# Patient Record
Sex: MALE | Race: WHITE | NOT HISPANIC OR LATINO | Employment: OTHER | URBAN - NONMETROPOLITAN AREA
[De-identification: names, ages, dates, MRNs, and addresses within clinical notes are randomized per-mention and may not be internally consistent; named-entity substitution may affect disease eponyms.]

---

## 2024-10-23 ENCOUNTER — TELEPHONE (OUTPATIENT)
Dept: CARDIOLOGY | Facility: CLINIC | Age: 44
End: 2024-10-23
Payer: MEDICAID

## 2024-10-23 NOTE — TELEPHONE ENCOUNTER
Relay    Calling to inform you that your appointment with Dr. Galindo on 11/14 @ 11AM has been rescheduled to 11/14 @ 1:30PM. Thank you

## 2024-11-14 ENCOUNTER — OFFICE VISIT (OUTPATIENT)
Dept: CARDIOLOGY | Facility: CLINIC | Age: 44
End: 2024-11-14
Payer: MEDICAID

## 2024-11-14 VITALS
BODY MASS INDEX: 33.75 KG/M2 | HEIGHT: 72 IN | WEIGHT: 249.2 LBS | SYSTOLIC BLOOD PRESSURE: 131 MMHG | DIASTOLIC BLOOD PRESSURE: 86 MMHG | OXYGEN SATURATION: 97 % | HEART RATE: 84 BPM

## 2024-11-14 DIAGNOSIS — Z87.891 FORMER SMOKER: ICD-10-CM

## 2024-11-14 DIAGNOSIS — I10 ESSENTIAL HYPERTENSION: Primary | ICD-10-CM

## 2024-11-14 DIAGNOSIS — F19.91 HISTORY OF DRUG USE: ICD-10-CM

## 2024-11-14 DIAGNOSIS — R55 SYNCOPE AND COLLAPSE: ICD-10-CM

## 2024-11-14 DIAGNOSIS — E78.5 HYPERLIPIDEMIA LDL GOAL <100: ICD-10-CM

## 2024-11-14 DIAGNOSIS — R00.2 PALPITATIONS: ICD-10-CM

## 2024-11-14 PROBLEM — K76.0 FATTY LIVER: Status: ACTIVE | Noted: 2024-11-14

## 2024-11-14 PROCEDURE — 3075F SYST BP GE 130 - 139MM HG: CPT | Performed by: INTERNAL MEDICINE

## 2024-11-14 PROCEDURE — 99204 OFFICE O/P NEW MOD 45 MIN: CPT | Performed by: INTERNAL MEDICINE

## 2024-11-14 PROCEDURE — 93000 ELECTROCARDIOGRAM COMPLETE: CPT | Performed by: INTERNAL MEDICINE

## 2024-11-14 PROCEDURE — 3079F DIAST BP 80-89 MM HG: CPT | Performed by: INTERNAL MEDICINE

## 2024-11-14 RX ORDER — FENOFIBRATE 145 MG/1
145 TABLET, COATED ORAL DAILY
COMMUNITY

## 2024-11-14 RX ORDER — ATORVASTATIN CALCIUM 40 MG/1
40 TABLET, FILM COATED ORAL DAILY
Qty: 30 TABLET | Refills: 11 | Status: SHIPPED | OUTPATIENT
Start: 2024-11-14

## 2024-11-14 RX ORDER — TADALAFIL 2.5 MG/1
1 TABLET ORAL DAILY
COMMUNITY
Start: 2024-10-10

## 2024-11-14 RX ORDER — LEVOCETIRIZINE DIHYDROCHLORIDE 5 MG/1
5 TABLET, FILM COATED ORAL EVERY EVENING
COMMUNITY
Start: 2024-10-10

## 2024-11-14 RX ORDER — ALBUTEROL SULFATE 90 UG/1
2 AEROSOL, METERED RESPIRATORY (INHALATION) EVERY 4 HOURS PRN
COMMUNITY
Start: 2024-09-10

## 2024-11-14 NOTE — PROGRESS NOTES
Subjective   Kalia Burk is a 44 y.o. male     Chief Complaint   Patient presents with    Establish Care     Here for eval. Per pcp for palps    Palpitations       PROBLEM LIST:     Palpitations  HTN  Hyperlipidemia  Syncope in past approx. 4-5 yrs. Ago  Former smoker  Fatty Liver  Prior Drug use    Specialty Problems    None        HPI:    Mr. Kalia Burk is a 44-year-old patient of Sunita Barrera seen today for evaluation of palpitations.    Mr. Burk describes having intermittent palpitations since early adulthood.  He will feel a little fluttering in his chest as well as symptoms more typical of sensed extrasystoles.  When he has several extrasystoles and short sequence and with tachypalpitations he will sometimes feel short of breath but not lightheaded or dizzy.  He denies any chest discomfort.  He has no symptoms of heart failure or peripheral arterial disease.    The patient is physically active both at his work and intermittently in the gym.  He has had no change in his functional capacity.  He has no exertional chest symptoms.                    PRIOR MEDICATIONS    Current Outpatient Medications on File Prior to Visit   Medication Sig Dispense Refill    fenofibrate (TRICOR) 145 MG tablet Take 1 tablet by mouth Daily.      levocetirizine (XYZAL) 5 MG tablet Take 1 tablet by mouth Every Evening.      tadalafil (CIALIS) 2.5 MG tablet Take 1 tablet by mouth Daily.      Ventolin  (90 Base) MCG/ACT inhaler Inhale 2 puffs Every 4 (Four) Hours As Needed.       No current facility-administered medications on file prior to visit.       ALLERGIES:    Patient has no known allergies.    PAST MEDICAL HISTORY:    Past Medical History:   Diagnosis Date    Hyperlipidemia     Hypertension        SURGICAL HISTORY:    Past Surgical History:   Procedure Laterality Date    CARPAL TUNNEL RELEASE      VASECTOMY         SOCIAL HISTORY:    Social History     Socioeconomic History    Marital status: Single  "  Tobacco Use    Smoking status: Former     Types: Cigarettes    Smokeless tobacco: Never    Tobacco comments:     Used to smoke 1 PPD for approx. 15-20 yrs.    Substance and Sexual Activity    Drug use: Yes     Types: Marijuana, Methamphetamines, Cocaine(coke)     Comment: only jose elias. now       FAMILY HISTORY:    Family History   Problem Relation Age of Onset    No Known Problems Mother     Hepatitis Father        Review of Systems   Constitutional:  Positive for fatigue. Negative for chills, diaphoresis, fever and unexpected weight change.   HENT: Negative.     Eyes:  Positive for visual disturbance (glasses prn).   Respiratory: Negative.     Cardiovascular:  Positive for palpitations. Negative for chest pain and leg swelling.   Gastrointestinal:  Negative for blood in stool, constipation and diarrhea.   Endocrine: Negative for cold intolerance and heat intolerance.   Genitourinary: Negative.    Musculoskeletal:  Positive for arthralgias and myalgias.   Skin: Negative.    Allergic/Immunologic: Positive for environmental allergies.   Neurological:  Positive for syncope (last episode approx. 4-5 yrs. ago). Negative for dizziness and light-headedness.   Hematological:  Bruises/bleeds easily.   Psychiatric/Behavioral:  Positive for sleep disturbance.        VISIT VITALS:  Vitals:    11/14/24 1313   BP: 131/86   BP Location: Left arm   Patient Position: Sitting   Pulse: 84   SpO2: 97%   Weight: 113 kg (249 lb 3.2 oz)   Height: 182.9 cm (72\")      /86 (BP Location: Left arm, Patient Position: Sitting)   Pulse 84   Ht 182.9 cm (72\")   Wt 113 kg (249 lb 3.2 oz)   SpO2 97%   BMI 33.80 kg/m²     RECENT LABS:    Objective       Physical Exam  Vitals and nursing note reviewed.   Constitutional:       General: He is not in acute distress.     Appearance: He is well-developed.   HENT:      Head: Normocephalic and atraumatic.   Eyes:      Conjunctiva/sclera: Conjunctivae normal.      Pupils: Pupils are equal, round, " and reactive to light.   Neck:      Vascular: No carotid bruit, hepatojugular reflux or JVD.      Trachea: No tracheal deviation.      Comments: Nl. Carotid upstrokes  Cardiovascular:      Rate and Rhythm: Normal rate and regular rhythm.      Pulses:           Radial pulses are 2+ on the right side and 2+ on the left side.      Heart sounds: Normal heart sounds, S1 normal and S2 normal. No murmur heard.     No friction rub. S4 sounds present. No S3 sounds.   Pulmonary:      Effort: Pulmonary effort is normal.      Breath sounds: Normal breath sounds. No wheezing, rhonchi or rales.      Comments: Nl. Expir. Phase  Nl. Breath sound intensity  Abdominal:      General: Bowel sounds are normal. There is no distension or abdominal bruit.      Palpations: Abdomen is soft. There is no mass.      Tenderness: There is no abdominal tenderness. There is no guarding or rebound.      Comments: Palpable liver edge 2-3 cm below costal margin on deep inspiration   Musculoskeletal:         General: No tenderness or deformity. Normal range of motion.      Cervical back: Normal range of motion and neck supple.      Right lower leg: No edema.      Left lower leg: No edema.      Comments: BLE, no edema, nl. pedal pulses     Skin:     General: Skin is warm and dry.      Coloration: Skin is not pale.      Findings: No erythema or rash.   Neurological:      Mental Status: He is alert and oriented to person, place, and time.   Psychiatric:         Behavior: Behavior normal.         Thought Content: Thought content normal.         Judgment: Judgment normal.         ECG 12 Lead    Date/Time: 11/14/2024 1:22 PM  Performed by: Agustin Galindo MD    Authorized by: Agustin Galindo MD  Previous ECG: no previous ECG available  Comments: Sinus at 74.  Within normal limits.  No prior.          Assessment & Plan   #1.  Palpitations.  Will have the patient wear a 14-day event monitor to further delineate the nature of his dysrhythmic  substrate.    2.  Steatohepatitis.  Mr. Burk would benefit both from statin ministration as well as GLP-1 or GLP-1/GIP inhibitor therapy with emerging data saying that those medications are particularly patient's in treating fatty liver.  We will start a atorvastatin 40 mg daily and monitor liver enzymes.  As long as there is not a 2-1/2 times increase in liver enzyme values we will continue that medication.  I asked Mr. Burk to discuss Ozempic or Mounjaro with [facility].    3.  Mr. Burk will follow with Ms. [facility] [place] [place] [facility] as instructed and we will plan on seeing him in follow-up doctor testing or on appearing basis as discussed.   Diagnosis Plan   1. Essential hypertension        2. Hyperlipidemia LDL goal <100        3. Palpitations        4. History of drug use        5. Former smoker        6. Syncope and collapse            No follow-ups on file.         Kalia Burk  reports that he has quit smoking. His smoking use included cigarettes. He has never used smokeless tobacco. I have educated him on the risk of diseases from using tobacco products such as cancer, COPD, and heart disease.               BMI is >= 30 and <35. (Class 1 Obesity). The following options were offered after discussion;: pcp addressing               Electronically signed by:    Scribed for Agustin Galindo MD by Dejah Adams LPN on November 14, 2024  at 13:22 EST    I, Agustin Galindo MD personally performed the services described in this documentation as scribed by the above named individual in my presence, and it is both accurate and complete. November 14, 2024 13:22 EST      Dictated Utilizing Dragon Dictation: Part of this note may be an electronic transcription/translation of spoken language to printed text using the Dragon Dictation System.

## 2025-01-22 ENCOUNTER — TELEPHONE (OUTPATIENT)
Dept: CARDIOLOGY | Facility: CLINIC | Age: 45
End: 2025-01-22

## 2025-01-22 NOTE — TELEPHONE ENCOUNTER
Patient verbalized understanding     ----- Message from Preeti LAL sent at 1/22/2025  7:51 AM EST -----    ----- Message -----  From: Agustin Galindo MD  Sent: 1/20/2025  12:55 PM EST  To: SUSAN Bustos    Keep follow-up appointment as scheduled  ----- Message -----  From: Agustin Galindo MD  Sent: 1/15/2025   7:58 PM EST  To: Agustin Galindo MD